# Patient Record
Sex: FEMALE | Race: WHITE | Employment: FULL TIME | URBAN - METROPOLITAN AREA
[De-identification: names, ages, dates, MRNs, and addresses within clinical notes are randomized per-mention and may not be internally consistent; named-entity substitution may affect disease eponyms.]

---

## 2024-01-16 ENCOUNTER — HOSPITAL ENCOUNTER (OUTPATIENT)
Dept: MRI IMAGING | Age: 44
Discharge: HOME | End: 2024-01-16

## 2024-01-16 DIAGNOSIS — H47.10 UNSPECIFIED PAPILLEDEMA: ICD-10-CM

## 2024-01-30 ENCOUNTER — TELEPHONE (OUTPATIENT)
Dept: PULMONOLOGY | Age: 44
End: 2024-01-30

## 2024-01-30 DIAGNOSIS — G47.33 SEVERE OBSTRUCTIVE SLEEP APNEA: Primary | ICD-10-CM

## 2024-01-30 NOTE — TELEPHONE ENCOUNTER
Faxed cpap order, nasal pillows mask order, full face mask order, nasal mask order, sleep study, and ov notes to [facility] at [phone] via RightFax.

## 2024-01-30 NOTE — TELEPHONE ENCOUNTER
Pt called stating that she has a tumor or clot in her brain and they don't know what it is. Pt is now being forced to use the CPAP as the retina specialist thinks that untreated ISAI is related.  Pt asking for order to be sent to [facility]. Orders pending if appropriate. I also did advise pt that she is right at the cusp of needing a new office visit, so depending on how quickly DME processes her orders, this may affect her setup.     Scheduled pt for 31-90 day appt with Shanae 4/4/24.

## 2024-02-01 NOTE — TELEPHONE ENCOUNTER
Manuela from [facility] called asking to verify when orders were faxed and what number were sent to.  She states that [facility] doesn't have orders and she is trying to process them ASAP and asked for orders to be sent to [phone].  Printed and faxed orders, office note and sleep study.

## 2024-04-04 ENCOUNTER — OFFICE VISIT (OUTPATIENT)
Dept: PULMONOLOGY | Age: 44
End: 2024-04-04
Payer: COMMERCIAL

## 2024-04-04 ENCOUNTER — TELEPHONE (OUTPATIENT)
Dept: PULMONOLOGY | Age: 44
End: 2024-04-04

## 2024-04-04 VITALS
HEART RATE: 78 BPM | BODY MASS INDEX: 50.02 KG/M2 | OXYGEN SATURATION: 96 % | WEIGHT: 293 LBS | RESPIRATION RATE: 17 BRPM | SYSTOLIC BLOOD PRESSURE: 102 MMHG | HEIGHT: 64 IN | DIASTOLIC BLOOD PRESSURE: 70 MMHG

## 2024-04-04 DIAGNOSIS — G47.33 SEVERE OBSTRUCTIVE SLEEP APNEA: Primary | ICD-10-CM

## 2024-04-04 PROCEDURE — 99213 OFFICE O/P EST LOW 20 MIN: CPT

## 2024-04-04 ASSESSMENT — SLEEP AND FATIGUE QUESTIONNAIRES
ESS TOTAL SCORE: 7
HOW LIKELY ARE YOU TO NOD OFF OR FALL ASLEEP WHEN YOU ARE A PASSENGER IN A CAR FOR AN HOUR WITHOUT A BREAK: WOULD NEVER DOZE
HOW LIKELY ARE YOU TO NOD OFF OR FALL ASLEEP IN A CAR, WHILE STOPPED FOR A FEW MINUTES IN TRAFFIC: WOULD NEVER DOZE
HOW LIKELY ARE YOU TO NOD OFF OR FALL ASLEEP WHILE SITTING INACTIVE IN A PUBLIC PLACE: WOULD NEVER DOZE
HOW LIKELY ARE YOU TO NOD OFF OR FALL ASLEEP WHILE WATCHING TV: SLIGHT CHANCE OF DOZING
NECK CIRCUMFERENCE (INCHES): 17
HOW LIKELY ARE YOU TO NOD OFF OR FALL ASLEEP WHILE SITTING AND TALKING TO SOMEONE: WOULD NEVER DOZE
HOW LIKELY ARE YOU TO NOD OFF OR FALL ASLEEP WHILE SITTING QUIETLY AFTER LUNCH WITHOUT ALCOHOL: SLIGHT CHANCE OF DOZING
HOW LIKELY ARE YOU TO NOD OFF OR FALL ASLEEP WHILE SITTING AND READING: MODERATE CHANCE OF DOZING
HOW LIKELY ARE YOU TO NOD OFF OR FALL ASLEEP WHILE LYING DOWN TO REST IN THE AFTERNOON WHEN CIRCUMSTANCES PERMIT: HIGH CHANCE OF DOZING

## 2024-04-04 NOTE — PATIENT INSTRUCTIONS
Attention:   Effective June 1, 2024, I will be leaving pulmonary and critical care medicine and no longer be seeing patients in [facility]'s pulmonary office.    I will be transitioning to a new role, focusing exclusively on sleep medicine and will have new offices at [facility] & [facility].    My new primary office will still be in this building -- [address] at [facility] [address] (shared with [facility] specialists).    My new office phone number will be [phone].       It was great to see you again and take care Aide!  -Shanae      Never drive a car or operate a motorized vehicle while drowsy or sleepy.       Sleep Hygiene... Important practices for better sleep:    Avoid naps. This will ensure you are sleepy at bedtime.  If you have to take a nap, sleep less than 1 hour, before 3 pm.  SCHEDULE: Have a fixed bedtime and awakening time. The human body thrives on routines. Only deviate from these set sleep times about 1-2 hours on the weekends (more than this will start altering your internal clock). You will feel better keeping a regular sleep cycle and giving your body a dependable pattern, even (especially) if you are retired or not working.   Use light to train your biological clock: When you get up in the morning, exposure yourself to bright lights. When preparing for bed, dim the lights and avoid exposure to screens.  The blue light from electronic screens tells our brain that it is time to be awake; it inhibits melatonin production which stops our brain from helping us get to sleep.   Go to bed only when sleepy; this reduces the time you are awake in bed (which can lead to frustration and negative thoughts about sleep). If you can't fall asleep within 15-30 minutes, get up and do something boring until you feel sleepy again. Sit quietly in the dark or read the warranty on your refrigerator. Don't expose yourself to bright light during this time (especially

## 2024-04-04 NOTE — PROGRESS NOTES
PULMONARY, CRITICAL CARE AND SLEEP MEDICINE   Outpatient Sleep Progress Note  CC: Snoring  [facility]    Interval History 4/4/24:  31-90  -  Had split night PSG 3/22/23 demonstrating very severe ISAI with  recommending APAP 13-15.  Office was unable to reach pt for an extended time and letter was sent for very severe ISAI.  However, recently patient started having visual changes and saw a retina specialist at [facility], found swelling of both optic nerves per patient report and planning imaging next week to look for clot or tumor (first 2 attempts unsuccessful).  [facility] recommended getting ISAI treated and thought to be a correlation with untreated ISAI so pt called the office 1/30/24 and was quickly set up with [employer] on 2/5/24.  Patient is doing pretty well on CPAP, had 2 URIs after starting, took some time to adjust humidity correctly.  Does not have heated tubing.  Also with nasal soreness from nasal pillows.  However, overall getting clear benefit from CPAP noticed by herself, her spouse and her family.  Needing less sleep, only ~6 hours, gets up feeling awake and ready for the day.  No longer falling asleep through the day.  Family has noticed more energy.  Spouse reported resolution in snoring.    -  ISAI treated with benefit with APAP 13-15; used 5:30 hrs avg with compliance >4 hour use 24/30 (used 29) days, residual AHI now 3.4 (3 obstructives).  Pressures: median 14.1, 95th% 14.9, max 14.9.  ESS: 7.       Presenting HPI 2/2/2023:  44 yo female with a several history of severe snoring, worse with ~70 lbs weight gain the last 2 yrs, associated with daytime sleepiness, & observed apneas. She suspects she has sleep apnea and has sought evaluation because of worsening symptoms and exhaustion the last 6 months.  + choking or gasping during sleep + AM headaches.  No treatments tried so far & has not been evaluated for sleep apnea in the past.  Gets ~ 6-8 hrs of sleep per night.  Does

## 2024-04-04 NOTE — TELEPHONE ENCOUNTER
Faxed pressure change order, CR, and ov notes to [facility] at [phone] via RightFax.

## 2024-04-05 ENCOUNTER — TELEPHONE (OUTPATIENT)
Dept: PULMONOLOGY | Age: 44
End: 2024-04-05

## 2024-05-16 ENCOUNTER — TELEPHONE (OUTPATIENT)
Dept: PULMONOLOGY | Age: 44
End: 2024-05-16

## 2024-05-16 NOTE — TELEPHONE ENCOUNTER
As in last encounter, ibuprofen was sent to pharmacy. Cannot prescribe pain medication without visit. Called and left vm   I called and talked to the patient to get her schedule for her 31-90 day F/U and patient said that she would like to hold off for now due to she has a lot of doctor's appointment right now. She said that they found a brain tumor and because of this she feels it best to wait at this time. I did tell patient that with staying in complicated with her insurance she will need to seen within the 31-90 day from the time she was set up with her cpap. I told her that I will call her back call her  to the 90 day candie. Patient did say that she using her Cpap every night and will try to make it in for that appointment.   LOV 4/4/2024  needs to be seen around the end of June or the beginning of July.

## 2024-07-22 ENCOUNTER — OFFICE VISIT (OUTPATIENT)
Dept: ORTHOPEDIC SURGERY | Age: 44
End: 2024-07-22
Payer: COMMERCIAL

## 2024-07-22 VITALS — WEIGHT: 270 LBS | BODY MASS INDEX: 47.84 KG/M2 | HEIGHT: 63 IN

## 2024-07-22 DIAGNOSIS — M54.50 ACUTE MIDLINE LOW BACK PAIN WITHOUT SCIATICA: Primary | ICD-10-CM

## 2024-07-22 PROCEDURE — 99203 OFFICE O/P NEW LOW 30 MIN: CPT | Performed by: PHYSICIAN ASSISTANT

## 2024-07-22 NOTE — PROGRESS NOTES
LOWER EXTREMITY:  Inspection/examination of the left lower extremity does not show any tenderness, deformity or injury. Range of motion is full. There is no gross instability. There are no rashes, ulcerations or lesions.  Strength and tone are normal.    Diagnostic Testing:      X-rays were ordered today and reviewed of the lumbar spine.  2 views.  AP and lateral views.  They demonstrate no evidence of fractures or dislocations with well-maintained disc space.  Mild facet spondylosis present      Impression:    Spondylosis        Plan:        Normally I would place the patient on a 30-20-Medrol prednisone taper.  With her undiagnosed neurologic condition I am not sure if this is safe for her or not.  I would ask you to check with her neurologist and she is welcome to give us a call back tomorrow.  In addition I will prescribe physical therapy with dry needling.  Spine specialty consultation in 3 weeks or sooner problems arise    I discussed with Aide Serrano that her history, symptoms, signs, and imaging are most consistent with  spondylosis .    We reviewed the natural history of these conditions and treatment options ranging from conservative measures (rest, icing, activity modification, physical therapy, pain meds) to surgical options.     In terms of treatment, I recommended continuing with rest, icing, avoidance of painful activities, NSAIDs or pain meds as tolerated, and physical therapy.     We discussed surgical options as well, should conservative measures fail.

## 2025-07-09 ENCOUNTER — HOSPITAL ENCOUNTER (EMERGENCY)
Age: 45
Discharge: HOME | End: 2025-07-09
Payer: COMMERCIAL

## 2025-07-09 ENCOUNTER — APPOINTMENT (OUTPATIENT)
Dept: GENERAL RADIOLOGY | Age: 45
End: 2025-07-09
Payer: COMMERCIAL

## 2025-07-09 VITALS
SYSTOLIC BLOOD PRESSURE: 143 MMHG | RESPIRATION RATE: 18 BRPM | OXYGEN SATURATION: 97 % | HEART RATE: 70 BPM | HEIGHT: 63 IN | WEIGHT: 269 LBS | BODY MASS INDEX: 47.66 KG/M2 | TEMPERATURE: 98.7 F | DIASTOLIC BLOOD PRESSURE: 100 MMHG

## 2025-07-09 DIAGNOSIS — M79.644 PAIN OF RIGHT MIDDLE FINGER: Primary | ICD-10-CM

## 2025-07-09 PROCEDURE — 73130 X-RAY EXAM OF HAND: CPT

## 2025-07-09 PROCEDURE — 99283 EMERGENCY DEPT VISIT LOW MDM: CPT

## 2025-07-09 ASSESSMENT — PAIN SCALES - GENERAL: PAINLEVEL_OUTOF10: 8

## 2025-07-09 ASSESSMENT — PAIN DESCRIPTION - LOCATION: LOCATION: FINGER (COMMENT WHICH ONE)

## 2025-07-09 ASSESSMENT — PAIN - FUNCTIONAL ASSESSMENT: PAIN_FUNCTIONAL_ASSESSMENT: 0-10

## 2025-07-09 ASSESSMENT — PAIN DESCRIPTION - PAIN TYPE: TYPE: ACUTE PAIN

## 2025-07-09 ASSESSMENT — PAIN DESCRIPTION - ORIENTATION: ORIENTATION: RIGHT

## 2025-07-15 NOTE — ED PROVIDER NOTES
[facility] EMERGENCY DEPARTMENT  EMERGENCY DEPARTMENT ENCOUNTER        Pt Name: Aide Serrano  MRN: [id number]  Birthdate 1980  Date of evaluation: 7/9/2025  Provider: BEBO Herbert - CNP  PCP: González Coyne (Inactive)        OCTAVIA. I have evaluated this patient.        CHIEF COMPLAINT       Chief Complaint   Patient presents with    Finger Pain     Patient dog leash got wrapped around right middle finger 2 months ago and pulled it to the side.  Patient states her finger pain and mobility has worsened.        HISTORY OF PRESENT ILLNESS: 1 or more Elements     History From: the patient  Limitations to history : None    Aide Serrano is a 45 y.o. female who presents to the ER today with complaints of right middle finger pain.  Patient states that 2 months ago she was walking her dog when she got the leash caught on her hand and it pulled her finger states that she still having pain in the area.  She is here for further evaluation    Nursing Notes were all reviewed and agreed with or any disagreements were addressed in the HPI.    REVIEW OF SYSTEMS :      Review of Systems    Positives and Pertinent negatives as per HPI.     SURGICAL HISTORY     Past Surgical History:   Procedure Laterality Date    ABDOMINAL EXPLORATION SURGERY  12/08    ectopic pregnancy    TUBAL LIGATION  2006 and 2008    tubes had to be retied.       CURRENTMEDICATIONS       Discharge Medication List as of 7/9/2025  9:25 AM        CONTINUE these medications which have NOT CHANGED    Details   levothyroxine (SYNTHROID) 137 MCG tablet Historical Med      omeprazole (PRILOSEC) 20 MG delayed release capsule Take 1 capsule by mouth dailyHistorical Med      rosuvastatin (CRESTOR) 10 MG tablet TAKE 1 TABLET BY ORAL ROUTE EVERY DAYHistorical Med      albuterol sulfate HFA (PROVENTIL;VENTOLIN;PROAIR) 108 (90 Base) MCG/ACT inhaler Inhale 2 puffs into the lungs every 4 hours as needed for Wheezing or Shortness of